# Patient Record
Sex: FEMALE | Race: BLACK OR AFRICAN AMERICAN | NOT HISPANIC OR LATINO | URBAN - NONMETROPOLITAN AREA
[De-identification: names, ages, dates, MRNs, and addresses within clinical notes are randomized per-mention and may not be internally consistent; named-entity substitution may affect disease eponyms.]

---

## 2019-04-12 ENCOUNTER — IMPORTED ENCOUNTER (OUTPATIENT)
Dept: URBAN - NONMETROPOLITAN AREA CLINIC 1 | Facility: CLINIC | Age: 69
End: 2019-04-12

## 2019-04-12 PROBLEM — E11.9: Noted: 2019-04-12

## 2019-04-12 PROBLEM — H25.13: Noted: 2019-04-12

## 2019-04-12 PROBLEM — H52.4: Noted: 2019-04-12

## 2019-04-12 PROCEDURE — 92004 COMPRE OPH EXAM NEW PT 1/>: CPT

## 2019-04-12 PROCEDURE — 92015 DETERMINE REFRACTIVE STATE: CPT

## 2019-04-12 NOTE — PATIENT DISCUSSION
IDDM  (Diagnosed 2004)- Discussed diagnosis in detail with patient- Stressed importance of good blood sugar control- Recommend no soda’s- Patient reports last A1C was 6.7 and last blood sugar was 91- Letter to Bruno in [place]- Continue to [place] OU- Discussed diagnosis in detail with patient- Discussed signs and symptoms of progression- Discussed UV protection- No treatment needed at this time - Continue to monitor. Presbyopia OU- Discussed refractive status in detail with patient. - New glasses Rx given today. - Continue to monitor.

## 2021-07-09 ENCOUNTER — IMPORTED ENCOUNTER (OUTPATIENT)
Dept: URBAN - NONMETROPOLITAN AREA CLINIC 1 | Facility: CLINIC | Age: 71
End: 2021-07-09

## 2021-07-09 PROBLEM — H52.4: Noted: 2021-07-09

## 2021-07-09 PROBLEM — H25.813: Noted: 2021-07-09

## 2021-07-09 PROBLEM — E11.9: Noted: 2021-07-09

## 2021-07-09 PROCEDURE — 92015 DETERMINE REFRACTIVE STATE: CPT

## 2021-07-09 PROCEDURE — 92014 COMPRE OPH EXAM EST PT 1/>: CPT

## 2021-07-09 NOTE — PATIENT DISCUSSION
IDDM  (Diagnosed 2004)- Discussed diagnosis in detail with patient- Stressed importance of good blood sugar control- Recommend no soda’s- No diabetic retinopathy seen on today's exam - Letter to Bruno in [place]- Continue to monitorCataracts OU- Discussed diagnosis in detail with patient- Discussed signs and symptoms of progression- Discussed UV protection- Progression noted today - No treatment needed at this time - Continue to monitor. Presbyopia OU- Discussed refractive status in detail with patient. - New glasses Rx given today MR re-checked today by Dr. Rubio Feliciano - Continue to monitor.

## 2022-04-10 ASSESSMENT — VISUAL ACUITY
OD_SC: 20/30-2
OD_SC: 20/25+
OS_SC: 20/25+
OS_PH: 20/40-2
OS_SC: 20/40
OS_GLARE: 20/40-
OU_CC: 20/30

## 2022-04-10 ASSESSMENT — TONOMETRY
OD_IOP_MMHG: 16
OD_IOP_MMHG: 15
OS_IOP_MMHG: 15
OS_IOP_MMHG: 16

## 2023-05-03 ENCOUNTER — CONSULTATION/EVALUATION (OUTPATIENT)
Dept: URBAN - NONMETROPOLITAN AREA CLINIC 1 | Facility: CLINIC | Age: 73
End: 2023-05-03

## 2023-05-03 DIAGNOSIS — H25.813: ICD-10-CM

## 2023-05-03 PROCEDURE — 92014 COMPRE OPH EXAM EST PT 1/>: CPT

## 2023-05-03 ASSESSMENT — VISUAL ACUITY
OS_PH: 20/50
OD_PAM: 20/25
OD_PH: 20/50
OD_CC: 20/400
OS_AM: 20/30-1
OS_CC: 20/100
OS_SC: 20/400
OD_SC: 20/400

## 2023-06-26 ENCOUNTER — PRE-OP/H&P (OUTPATIENT)
Dept: URBAN - NONMETROPOLITAN AREA CLINIC 1 | Facility: CLINIC | Age: 73
End: 2023-06-26

## 2023-06-26 VITALS
BODY MASS INDEX: 33.63 KG/M2 | DIASTOLIC BLOOD PRESSURE: 78 MMHG | HEIGHT: 64 IN | WEIGHT: 197 LBS | SYSTOLIC BLOOD PRESSURE: 142 MMHG | HEART RATE: 68 BPM

## 2023-06-26 DIAGNOSIS — Z01.818: ICD-10-CM

## 2023-06-26 PROCEDURE — 99213 OFFICE O/P EST LOW 20 MIN: CPT

## 2023-07-18 ENCOUNTER — SURGERY/PROCEDURE (OUTPATIENT)
Dept: URBAN - NONMETROPOLITAN AREA CLINIC 1 | Facility: CLINIC | Age: 73
End: 2023-07-18

## 2023-07-18 DIAGNOSIS — H25.811: ICD-10-CM

## 2023-07-18 PROCEDURE — 66984 XCAPSL CTRC RMVL W/O ECP: CPT

## 2023-07-19 ENCOUNTER — POST OP/EVAL OF SECOND EYE (OUTPATIENT)
Dept: URBAN - NONMETROPOLITAN AREA CLINIC 1 | Facility: CLINIC | Age: 73
End: 2023-07-19

## 2023-07-19 DIAGNOSIS — H25.811: ICD-10-CM

## 2023-07-19 PROCEDURE — 99213 OFFICE O/P EST LOW 20 MIN: CPT

## 2023-07-19 ASSESSMENT — VISUAL ACUITY
OS_AM: 20/30
OS_PH: 20/50
OD_SC: 20/40
OS_SC: 20/400
OS_CC: 20/100
OD_PH: 20/25-1

## 2023-07-19 ASSESSMENT — TONOMETRY
OD_IOP_MMHG: 16
OS_IOP_MMHG: 16

## 2023-07-25 ENCOUNTER — SURGERY/PROCEDURE (OUTPATIENT)
Dept: URBAN - NONMETROPOLITAN AREA CLINIC 1 | Facility: CLINIC | Age: 73
End: 2023-07-25

## 2023-07-25 DIAGNOSIS — H25.812: ICD-10-CM

## 2023-07-25 PROCEDURE — 66984 XCAPSL CTRC RMVL W/O ECP: CPT

## 2023-07-27 ENCOUNTER — POST-OP (OUTPATIENT)
Dept: URBAN - NONMETROPOLITAN AREA CLINIC 1 | Facility: CLINIC | Age: 73
End: 2023-07-27

## 2023-07-27 DIAGNOSIS — Z98.42: ICD-10-CM

## 2023-07-27 DIAGNOSIS — Z98.41: ICD-10-CM

## 2023-07-27 PROCEDURE — 99024 POSTOP FOLLOW-UP VISIT: CPT

## 2023-07-27 ASSESSMENT — VISUAL ACUITY
OS_SC: 20/30-1
OD_SC: 20/30-1

## 2023-07-27 ASSESSMENT — TONOMETRY
OD_IOP_MMHG: 12
OS_IOP_MMHG: 12

## 2023-08-01 ENCOUNTER — POST-OP (OUTPATIENT)
Dept: URBAN - NONMETROPOLITAN AREA CLINIC 1 | Facility: CLINIC | Age: 73
End: 2023-08-01

## 2023-08-01 DIAGNOSIS — Z98.42: ICD-10-CM

## 2023-08-01 DIAGNOSIS — Z98.41: ICD-10-CM

## 2023-08-01 PROCEDURE — 99024 POSTOP FOLLOW-UP VISIT: CPT

## 2023-08-01 ASSESSMENT — VISUAL ACUITY
OD_SC: 20/25
OS_SC: 20/20
OU_SC: 20/20-1

## 2023-08-01 ASSESSMENT — TONOMETRY
OD_IOP_MMHG: 16
OS_IOP_MMHG: 16

## 2023-11-02 ENCOUNTER — FOLLOW UP (OUTPATIENT)
Dept: URBAN - NONMETROPOLITAN AREA CLINIC 1 | Facility: CLINIC | Age: 73
End: 2023-11-02

## 2023-11-02 DIAGNOSIS — H52.4: ICD-10-CM

## 2023-11-02 DIAGNOSIS — Z96.1: ICD-10-CM

## 2023-11-02 DIAGNOSIS — H26.493: ICD-10-CM

## 2023-11-02 PROCEDURE — 99213 OFFICE O/P EST LOW 20 MIN: CPT

## 2023-11-02 PROCEDURE — 92015 DETERMINE REFRACTIVE STATE: CPT

## 2023-11-02 ASSESSMENT — TONOMETRY
OS_IOP_MMHG: 15
OD_IOP_MMHG: 14
OD_IOP_MMHG: 15

## 2023-11-02 ASSESSMENT — VISUAL ACUITY
OD_SC: 20/32-2
OS_SC: 20/32-2
OU_SC: 20/22

## 2025-02-12 ENCOUNTER — COMPREHENSIVE EXAM (OUTPATIENT)
Age: 75
End: 2025-02-12

## 2025-02-12 DIAGNOSIS — H52.4: ICD-10-CM

## 2025-02-12 PROCEDURE — 92015 DETERMINE REFRACTIVE STATE: CPT

## 2025-02-12 PROCEDURE — 92014 COMPRE OPH EXAM EST PT 1/>: CPT
